# Patient Record
Sex: MALE | Race: BLACK OR AFRICAN AMERICAN
[De-identification: names, ages, dates, MRNs, and addresses within clinical notes are randomized per-mention and may not be internally consistent; named-entity substitution may affect disease eponyms.]

---

## 2018-03-10 ENCOUNTER — HOSPITAL ENCOUNTER (EMERGENCY)
Dept: HOSPITAL 8 - ED | Age: 38
Discharge: HOME | End: 2018-03-10
Payer: COMMERCIAL

## 2018-03-10 VITALS — WEIGHT: 180.56 LBS | BODY MASS INDEX: 29.02 KG/M2 | HEIGHT: 66 IN

## 2018-03-10 VITALS — SYSTOLIC BLOOD PRESSURE: 145 MMHG | DIASTOLIC BLOOD PRESSURE: 87 MMHG
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
## 2018-03-10 DIAGNOSIS — L03.90: ICD-10-CM

## 2018-03-10 DIAGNOSIS — Z23: ICD-10-CM
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
## 2018-03-10 DIAGNOSIS — S91.214A: Primary | ICD-10-CM
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
## 2018-03-10 PROCEDURE — 12001 RPR S/N/AX/GEN/TRNK 2.5CM/<: CPT

## 2018-03-10 PROCEDURE — 90715 TDAP VACCINE 7 YRS/> IM: CPT

## 2018-03-10 PROCEDURE — 90471 IMMUNIZATION ADMIN: CPT

## 2018-03-21 ENCOUNTER — HOSPITAL ENCOUNTER (EMERGENCY)
Dept: HOSPITAL 8 - ED | Age: 38
Discharge: HOME | End: 2018-03-21
Payer: COMMERCIAL

## 2018-03-21 VITALS — WEIGHT: 174.17 LBS | HEIGHT: 66 IN | BODY MASS INDEX: 27.99 KG/M2

## 2018-03-21 VITALS — DIASTOLIC BLOOD PRESSURE: 87 MMHG | SYSTOLIC BLOOD PRESSURE: 138 MMHG

## 2018-03-21 DIAGNOSIS — F17.200: ICD-10-CM

## 2018-03-21 DIAGNOSIS — Z48.02: Primary | ICD-10-CM

## 2018-03-21 PROCEDURE — 99281 EMR DPT VST MAYX REQ PHY/QHP: CPT

## 2019-06-17 ENCOUNTER — HOSPITAL ENCOUNTER (EMERGENCY)
Dept: HOSPITAL 8 - ED | Age: 39
Discharge: HOME | End: 2019-06-17
Payer: COMMERCIAL

## 2019-06-17 ENCOUNTER — HOSPITAL ENCOUNTER (EMERGENCY)
Dept: HOSPITAL 8 - ED | Age: 39
LOS: 1 days | Discharge: HOME | End: 2019-06-18
Payer: COMMERCIAL

## 2019-06-17 VITALS — SYSTOLIC BLOOD PRESSURE: 166 MMHG | DIASTOLIC BLOOD PRESSURE: 93 MMHG

## 2019-06-17 VITALS — DIASTOLIC BLOOD PRESSURE: 87 MMHG | SYSTOLIC BLOOD PRESSURE: 133 MMHG

## 2019-06-17 VITALS — WEIGHT: 174.39 LBS | HEIGHT: 66 IN | BODY MASS INDEX: 28.03 KG/M2

## 2019-06-17 DIAGNOSIS — R51: Primary | ICD-10-CM

## 2019-06-17 DIAGNOSIS — F17.200: ICD-10-CM

## 2019-06-17 DIAGNOSIS — B34.9: ICD-10-CM

## 2019-06-17 DIAGNOSIS — G43.C0: Primary | ICD-10-CM

## 2019-06-17 DIAGNOSIS — H65.01: ICD-10-CM

## 2019-06-17 LAB
GLUCOSE CSF-MCNC: 77 MG/DL (ref 40–80)
TOTAL PROTEIN,CSF: 31 MG/DL (ref 15–45)

## 2019-06-17 PROCEDURE — 96372 THER/PROPH/DIAG INJ SC/IM: CPT

## 2019-06-17 PROCEDURE — 89051 BODY FLUID CELL COUNT: CPT

## 2019-06-17 PROCEDURE — 87252 VIRUS INOCULATION TISSUE: CPT

## 2019-06-17 PROCEDURE — 99284 EMERGENCY DEPT VISIT MOD MDM: CPT

## 2019-06-17 PROCEDURE — 82945 GLUCOSE OTHER FLUID: CPT

## 2019-06-17 PROCEDURE — 87070 CULTURE OTHR SPECIMN AEROBIC: CPT

## 2019-06-17 PROCEDURE — 84157 ASSAY OF PROTEIN OTHER: CPT

## 2019-06-17 PROCEDURE — 62270 DX LMBR SPI PNXR: CPT

## 2019-06-17 PROCEDURE — 87205 SMEAR GRAM STAIN: CPT

## 2019-06-17 PROCEDURE — 70450 CT HEAD/BRAIN W/O DYE: CPT

## 2019-06-17 NOTE — NUR
LP was done at bedside by angely liu and this rn assist 

vss updated  pt is laying flat for at least one hr   pt was informed

## 2019-06-17 NOTE — NUR
DISCHARGE ORDERS RECIEVED, PT GIVEN DISCHARGE INSTRUCTIONS, UNDERSTANDING 
STATED. VERIFEID ALL BELONGINGS WITH PT ON DISCHARGE. PT ESCORTED TO CHECK OUT,